# Patient Record
Sex: FEMALE | Race: WHITE | NOT HISPANIC OR LATINO | Employment: PART TIME | URBAN - METROPOLITAN AREA
[De-identification: names, ages, dates, MRNs, and addresses within clinical notes are randomized per-mention and may not be internally consistent; named-entity substitution may affect disease eponyms.]

---

## 2019-02-26 ENCOUNTER — TRANSFERRED RECORDS (OUTPATIENT)
Dept: HEALTH INFORMATION MANAGEMENT | Facility: CLINIC | Age: 59
End: 2019-02-26

## 2019-02-26 ENCOUNTER — MEDICAL CORRESPONDENCE (OUTPATIENT)
Dept: HEALTH INFORMATION MANAGEMENT | Facility: CLINIC | Age: 59
End: 2019-02-26

## 2019-02-26 LAB
CHOLEST SERPL-MCNC: 215 MG/DL (ref 100–199)
GLUCOSE SERPL-MCNC: 87 MG/DL (ref 65–100)
HDLC SERPL-MCNC: 91 MG/DL
HEP C HIM: NORMAL
LDLC SERPL CALC-MCNC: 112 MG/DL
NONHDLC SERPL-MCNC: 124 MG/DL
TRIGL SERPL-MCNC: 60 MG/DL
TSH SERPL-ACNC: 2.44 UIU/ML (ref 0.35–4.94)

## 2019-07-11 ENCOUNTER — PRE VISIT (OUTPATIENT)
Dept: DERMATOLOGY | Facility: CLINIC | Age: 59
End: 2019-07-11

## 2019-07-22 ENCOUNTER — TELEPHONE (OUTPATIENT)
Dept: DERMATOLOGY | Facility: CLINIC | Age: 59
End: 2019-07-22

## 2019-07-22 NOTE — TELEPHONE ENCOUNTER
Left message for patient regarding upcoming appointment on 8/6/19 at 1015am.  Advised PT to call [phone] for previsit questions.  Shannan

## 2019-07-30 NOTE — TELEPHONE ENCOUNTER
**LOOK AT HEALTH MAINTENANCE**        Dermatology Pre-visit Call:    Reason for visit: Hair Loss     Any personal history of skin cancers: Yes    Any family history of skin cancers: Yes    Was the patient referred: Yes    If the patient was referred, are records obtained: Care Everywhere    Has the patient seen a dermatologist in the past: Yes. Records obtained: No - pt will only be seen at [facility] for hair loss - She will continue care for her history of NMSC with her regular dermatologist.    Patient Reminders Given:  --Please, make sure you bring an updated list of your medications, allergies and insurance information.  --Plan on being in our facility for approximately one hour, this includes the registration process, office visit, education and check-out process.  If you are having a procedure, more time may be required.     --We are located on the second floor of the building, check in desk D.   --If you need to cancel or reschedule, call [phone].  --We look forward to seeing you in Dermatology Clinic.       Sonali Hernandez, CMA

## 2019-08-06 ENCOUNTER — OFFICE VISIT (OUTPATIENT)
Dept: DERMATOLOGY | Facility: CLINIC | Age: 59
End: 2019-08-06
Payer: COMMERCIAL

## 2019-08-06 DIAGNOSIS — L64.9 ANDROGENETIC ALOPECIA: Primary | ICD-10-CM

## 2019-08-06 LAB
BASOPHILS # BLD AUTO: 0 10E9/L (ref 0–0.2)
BASOPHILS NFR BLD AUTO: 0.6 %
DIFFERENTIAL METHOD BLD: NORMAL
EOSINOPHIL # BLD AUTO: 0.1 10E9/L (ref 0–0.7)
EOSINOPHIL NFR BLD AUTO: 1 %
ERYTHROCYTE [DISTWIDTH] IN BLOOD BY AUTOMATED COUNT: 13.3 % (ref 10–15)
FERRITIN SERPL-MCNC: 65 NG/ML (ref 8–252)
HCT VFR BLD AUTO: 42.1 % (ref 35–47)
HGB BLD-MCNC: 13.7 G/DL (ref 11.7–15.7)
IMM GRANULOCYTES # BLD: 0 10E9/L (ref 0–0.4)
IMM GRANULOCYTES NFR BLD: 0.5 %
LYMPHOCYTES # BLD AUTO: 2.1 10E9/L (ref 0.8–5.3)
LYMPHOCYTES NFR BLD AUTO: 32.7 %
MCH RBC QN AUTO: 29.1 PG (ref 26.5–33)
MCHC RBC AUTO-ENTMCNC: 32.5 G/DL (ref 31.5–36.5)
MCV RBC AUTO: 89 FL (ref 78–100)
MONOCYTES # BLD AUTO: 0.5 10E9/L (ref 0–1.3)
MONOCYTES NFR BLD AUTO: 7.8 %
NEUTROPHILS # BLD AUTO: 3.6 10E9/L (ref 1.6–8.3)
NEUTROPHILS NFR BLD AUTO: 57.4 %
PLATELET # BLD AUTO: 254 10E9/L (ref 150–450)
RBC # BLD AUTO: 4.71 10E12/L (ref 3.8–5.2)
TSH SERPL DL<=0.005 MIU/L-ACNC: 1.6 MU/L (ref 0.4–4)
WBC # BLD AUTO: 6.3 10E9/L (ref 4–11)

## 2019-08-06 PROCEDURE — 84443 ASSAY THYROID STIM HORMONE: CPT | Performed by: DERMATOLOGY

## 2019-08-06 PROCEDURE — 82728 ASSAY OF FERRITIN: CPT | Performed by: DERMATOLOGY

## 2019-08-06 PROCEDURE — 36415 COLL VENOUS BLD VENIPUNCTURE: CPT | Performed by: DERMATOLOGY

## 2019-08-06 PROCEDURE — 85025 COMPLETE CBC W/AUTO DIFF WBC: CPT | Performed by: DERMATOLOGY

## 2019-08-06 PROCEDURE — 99202 OFFICE O/P NEW SF 15 MIN: CPT | Performed by: DERMATOLOGY

## 2019-08-06 PROCEDURE — 82306 VITAMIN D 25 HYDROXY: CPT | Performed by: DERMATOLOGY

## 2019-08-06 ASSESSMENT — PAIN SCALES - GENERAL: PAINLEVEL: NO PAIN (0)

## 2019-08-06 NOTE — PROGRESS NOTES
[facility] Dermatology Note      Dermatology Problem List:  1. BCC  - previously treated at [facility]. Plans to continue skin checks at [facility] in the future.   2. Androgenetic Alopecia  - current t/x: Rogaine 5% solution   - labs ordered 8/6/19    Encounter Date: Aug 6, 2019    CC:  Chief Complaint   Patient presents with     Hair Loss     5 years, tried a shampoo and conditioner cant remember the name, tried rogaine without results, noticing most on crown above forehead.       History of Present Illness:  Ms. Griselda Lobo is a 59 year old female who presents in self referral for hair loss. She has not been previously seen by a dermatologist for her hair loss. She has been losing hair for 10 years, mostly around the crown and above her forehead. Was gradually losing hair but is has seemed it has seemed to speed up in 2 years. She has tried a shampoo and conditioner but cannot remember which one. She has also tried using Rogaine without significant results. She tried the 2% foam and did not like how it made her hair greasy. She used this about a year and a half ago. She only used it for less than a month. She is afraid to wash her hair as she notices it falling out and breaking hair. She washes her hair twice a week. She states hair is both thinning and shedding. She denies loss of eyebrows and eye lashes but sparse pubic hair. She wears hair in pony tail but not tightly. She dries with hair dryer and dyes her hair regularly. She notes little scalp itch, no sores. She is postmenopausal for last 16 years. She has a personal and family history of bcc in her dad. No other concerns addressed today.      Past Medical History:   Patient Active Problem List   Diagnosis     History of basal cell carcinoma     Impingement syndrome of right shoulder     Right shoulder pain     Scapular dyskinesis     History reviewed. No pertinent past medical history.  History reviewed. No pertinent  surgical history.    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     Family History:  Family history of BCC in father. Mom with hair loss (mom is in her 90's but has very little hair remaining on the central scalp)    Medications:  No current outpatient medications on file.       No Known Allergies    Review of Systems:  -Constitutional: Patient is otherwise feeling well, in usual state of health.   -Skin: As above in HPI. No additional skin concerns.    Physical exam:  Vitals: There were no vitals taken for this visit.  GEN: This is a well developed, well-nourished female in no acute distress, in a pleasant mood.   SKIN: Sun-exposed skin, which includes the head/face, neck, both arms, digits, and/or nails was examined.   - Waldron Type I-II  - Frontal hairline not regressed  - Terminal sparse 2 cm along frontal hair line  - Temporal hair line is not regressed, normal vellus hairs throughout this area  - Normal eyebrows and eye lashes  - Part width is slightly widened at 1.1, more pronounced at the anterior aspect rather than at the posterior aspect, has franko tree appearance.   - No other lesions of concern on areas examined.                       Impression/Plan:    1. Androgenetic Alopecia. Patient notes she is emotionally affected by this hair loss. 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 Discussed that the pathogenesis is not well understood and that without treatment this is a progressive condition. The most effective and researched treatment is Rogaine. Patient is willing to retry. If she is unable to tolerate, photobiomodulation is an option. Gave handout on this today. Discussed that finasteride in women is typically not very helpful and has significant side effects. Discussed that hair transplant in women has more unpredictable results. She could  see Dr. Sebastián Browning if interested in this for consultation.     Advised patient to begin Rogaine 5% solution. Discussed increased shedding in first month of use. Discussed that patient should commit to treatment for 6 months before noticing results. Discussed potential side effects of Rogaine, including scalp irritation and facial hair growth.    Advised against the use of Biotin,as there is no evidence that it improves hair loss and could result in abnormal lab findings.    Labs ordered: ferritin, vitamin D, platelet count, TSH       Follow-up in 6 months for hair loss, earlier for new or changing lesions.       Staff Involved:  Scribe/Staff    Scribe Disclosure  I, Josefina Atkins, am serving as a scribe to document services personally performed by Dr. Paige Riddle MD, based on data collection and the provider's statements to me.     Provider Disclosure:   The documentation recorded by the scribe accurately reflects the services I personally performed and the decisions made by me.    Paige Riddle MD    Department of Dermatology  [facility]: Phone: [phone], Fax:[phone]  [facility]: Phone: [phone], Fax: [phone]

## 2019-08-06 NOTE — NURSING NOTE
Griselda Lobo's goals for this visit include:   Chief Complaint   Patient presents with     Hair Loss     5 years, tried a shampoo and conditioner cant remember the name, tried rogaine without results, noticing most on crown above forehead.       She requests these members of her care team be copied on today's visit information: Yes    PCP: Mirna Valdes    Referring Provider:  No referring provider defined for this encounter.    There were no vitals taken for this visit.    Do you need any medication refills at today's visit? Francoise Ballesteros LPN

## 2019-08-06 NOTE — LETTER
8/6/2019         RE: Griselda Lobo  [address] [place] [address]        Dear Colleague,    Thank you for referring your patient, Griselda Lobo, to the [facility]. Please see a copy of my visit note below.    [facility] Dermatology Note      Dermatology Problem List:  1. BCC  - previously treated at [facility]. Plans to continue skin checks at [facility] in the future.   2. Androgenetic Alopecia  - current t/x: Rogaine 5% solution   - labs ordered 8/6/19    Encounter Date: Aug 6, 2019    CC:  Chief Complaint   Patient presents with     Hair Loss     5 years, tried a shampoo and conditioner cant remember the name, tried rogaine without results, noticing most on crown above forehead.       History of Present Illness:  Ms. Griselda Lobo is a 59 year old female who presents in self referral for hair loss. She has not been previously seen by a dermatologist for her hair loss. She has been losing hair for 10 years, mostly around the crown and above her forehead. Was gradually losing hair but is has seemed it has seemed to speed up in 2 years. She has tried a shampoo and conditioner but cannot remember which one. She has also tried using Rogaine without significant results. She tried the 2% foam and did not like how it made her hair greasy. She used this about a year and a half ago. She only used it for less than a month. She is afraid to wash her hair as she notices it falling out and breaking hair. She washes her hair twice a week. She states hair is both thinning and shedding. She denies loss of eyebrows and eye lashes but sparse pubic hair. She wears hair in pony tail but not tightly. She dries with hair dryer and dyes her hair regularly. She notes little scalp itch, no sores. She is postmenopausal for last 16 years. She has a personal and family history of bcc in her dad. No other concerns addressed today.      Past Medical History:   Patient Active  Problem List   Diagnosis     History of basal cell carcinoma     Impingement syndrome of right shoulder     Right shoulder pain     Scapular dyskinesis     History reviewed. No pertinent past medical history.  History reviewed. No pertinent surgical history.    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     Family History:  Family history of BCC in father. Mom with hair loss (mom is in her 90's but has very little hair remaining on the central scalp)    Medications:  No current outpatient medications on file.       No Known Allergies    Review of Systems:  -Constitutional: Patient is otherwise feeling well, in usual state of health.   -Skin: As above in HPI. No additional skin concerns.    Physical exam:  Vitals: There were no vitals taken for this visit.  GEN: This is a well developed, well-nourished female in no acute distress, in a pleasant mood.   SKIN: Sun-exposed skin, which includes the head/face, neck, both arms, digits, and/or nails was examined.   - Waldron Type I-II  - Frontal hairline not regressed  - Terminal sparse 2 cm along frontal hair line  - Temporal hair line is not regressed, normal vellus hairs throughout this area  - Normal eyebrows and eye lashes  - Part width is slightly widened at 1.1, more pronounced at the anterior aspect rather than at the posterior aspect, has franko tree appearance.   - No other lesions of concern on areas examined.                       Impression/Plan:    1. Androgenetic Alopecia. Patient notes she is emotionally affected by this hair loss. 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 Discussed that the pathogenesis is not well understood and that without treatment this is a progressive condition. The most effective and researched treatment is Rogaine. Patient is willing to retry. If she is unable to tolerate,  photobiomodulation is an option. Gave handout on this today. Discussed that finasteride in women is typically not very helpful and has significant side effects. Discussed that hair transplant in women has more unpredictable results. She could see Dr. Sebastián Browning if interested in this for consultation.     Advised patient to begin Rogaine 5% solution. Discussed increased shedding in first month of use. Discussed that patient should commit to treatment for 6 months before noticing results. Discussed potential side effects of Rogaine, including scalp irritation and facial hair growth.    Advised against the use of Biotin,as there is no evidence that it improves hair loss and could result in abnormal lab findings.    Labs ordered: ferritin, vitamin D, platelet count, TSH       Follow-up in 6 months for hair loss, earlier for new or changing lesions.       Staff Involved:  Scribe/Staff    Scribe Disclosure  I, Josefina Atkins, am serving as a scribe to document services personally performed by Dr. Paige Riddle MD, based on data collection and the provider's statements to me.     Provider Disclosure:   The documentation recorded by the scribe accurately reflects the services I personally performed and the decisions made by me.    Paige Riddle MD    Department of Dermatology  [facility]: Phone: [phone], Fax:[phone]  [facility]: Phone: [phone], Fax: [phone]                Again, thank you for allowing me to participate in the care of your patient.        Sincerely,        Paige Riddle MD

## 2019-08-06 NOTE — PATIENT INSTRUCTIONS
Begin Rogaine 5% solution. Apply to the scalp and leave on for one hour each night. You will notice increased shedding in first month of use but you should commit to treatment for 6 months before noticing results. You may notice scalp irritation and facial hair growth.

## 2019-08-07 ENCOUNTER — TELEPHONE (OUTPATIENT)
Dept: DERMATOLOGY | Facility: CLINIC | Age: 59
End: 2019-08-07

## 2019-08-07 LAB — DEPRECATED CALCIDIOL+CALCIFEROL SERPL-MC: 22 UG/L (ref 20–75)

## 2019-08-07 NOTE — TELEPHONE ENCOUNTER
Result Notes for Vitamin D Deficiency     Notes recorded by Audra Schmitz LPN on 8/7/2019 at 4:38 PM CDT  Spoke to pt regarding results. Pt denies questions or concerns at this time.  Audra Schmitz LPN    ------    Notes recorded by Paige Riddle MD on 8/7/2019 at 3:32 PM CDT  Please let patient know all labs are wnl. No need for iron or vitamin D supplementation.    Thanks,    Paige Riddle MD    Department of Dermatology  [facility]: Phone: [phone], Fax:[phone]  [facility]: Phone: [phone], Fax: [phone]

## 2020-02-11 ENCOUNTER — OFFICE VISIT (OUTPATIENT)
Dept: DERMATOLOGY | Facility: CLINIC | Age: 60
End: 2020-02-11
Payer: COMMERCIAL

## 2020-02-11 DIAGNOSIS — L64.9 ANDROGENETIC ALOPECIA: Primary | ICD-10-CM

## 2020-02-11 PROCEDURE — 99213 OFFICE O/P EST LOW 20 MIN: CPT | Performed by: DERMATOLOGY

## 2020-02-11 ASSESSMENT — PAIN SCALES - GENERAL: PAINLEVEL: NO PAIN (0)

## 2020-02-11 NOTE — PROGRESS NOTES
"[facility] Dermatology Note    Dermatology Problem List:  1. BCC  - previously treated at [facility]. Plans to continue skin checks at [facility] in the future.   2. Androgenetic Alopecia  - current t/x: Rogaine 5% solution   - hair labs ordered 8/6/19, [facility]    Encounter Date: Feb 11, 2020    CC:  Chief Complaint   Patient presents with     Hair Loss     using rogaine nightly(1oz); seeing slight imprvement - noticing less fall out       History of Present Illness:  Ms. Griselda Lobo is a 60 year old female who presents as a follow-up for hair loss. The patient was last seen on 8/6/2019 when rogaine 5% solution was started and ferritin, vitamin D, platelet count, TSH labs were checked for androgenetic alopecia. Today the patient notes she has noticed less hair falling out since starting treatment with rogaine. She inquires about the amount of rogaine she should be using on her scalp. She uses 1 ounce currently. She wonders if she should be using 2 ounces. The patient states she went through menopause \"a long time ago\". No other concerns addressed today.    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  Reviewed and left in chart for clinician convenience.       Family History:  Family history of BCC in father. Mom with hair loss (mom is in her 90's but has very little hair remaining on the central scalp)  Reviewed and left in chart for clinician convenience.       Medications:  Current Outpatient Medications   Medication Sig Dispense Refill     minoxidil (MINOXIDIL FOR MEN) 5 % external solution          No Known Allergies    Review of Systems:  -Constitutional: Patient is otherwise feeling well, in usual state " of health.   -Skin: As above in HPI. No additional skin concerns.    Physical exam:  Vitals: There were no vitals taken for this visit.  GEN: This is a well developed, well-nourished female in no acute distress, in a pleasant mood.   SKIN: Sun-exposed skin, which includes the head/face, neck, both arms, digits, and/or nails was examined.   -No regression of the frontal hairline  -Terminal hair fibers that range in size between 4-5 cm along the frontal hair line  -Normal vellus hairs at the temples bilaterally  -Temporal hairline has not regressed   - Part width is within normal limits at a 1  - Overall on examination a slight decrease in density in the vertex and central scalp scalp, occipital and parietal scalp are within normal limits  - No other lesions of concern on areas examined.     Impression/Plan:    1. Androgenetic Alopecia. 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 She may have had  Minor telogen effluvium that unmasked AGA. Discussed that the pathogenesis is not well understood and that without treatment this is a progressive condition. Condition is stable at this time. Discussed that oral finasteride or platelet rich plasma injections are possible treatment option in the future.    Advised patient to continue Rogaine 5% solution. Discussed potential side effects of Rogaine, including scalp irritation and facial hair growth.    Advised against the use of Biotin, as there is no evidence that it improves hair loss and could result in abnormal lab findings.    Discussed finasteride or platelet rich plasma injections as possible treatment options in the future if patient is unhappy with results from rogaine    CC Paige Riddle MD  [address] [place] [phone] on close of this encounter.  Follow-up prn, earlier for new or changing lesions.     Staff Involved:  Scribe/Staff    Scribe Disclosure  I, Chris Vernon, am serving as a  scribe to document services personally performed by Dr. Paige Riddle MD, based on data collection and the provider's statements to me.     Provider Disclosure:   The documentation recorded by the scribe accurately reflects the services I personally performed and the decisions made by me.    Paige Riddle MD    Department of Dermatology  [facility]: Phone: [phone], Fax:[phone]  [facility]: Phone: [phone], Fax: [phone]

## 2020-02-11 NOTE — LETTER
"    2/11/2020         RE: Griselda Lobo  [address] [place] [address]        Dear Colleague,    Thank you for referring your patient, Griselda Lobo, to the [facility]. Please see a copy of my visit note below.    [facility] Dermatology Note    Dermatology Problem List:  1. BCC  - previously treated at [facility]. Plans to continue skin checks at [facility] in the future.   2. Androgenetic Alopecia  - current t/x: Rogaine 5% solution   - hair labs ordered 8/6/19, wnl    Encounter Date: Feb 11, 2020    CC:  Chief Complaint   Patient presents with     Hair Loss     using rogaine nightly(1oz); seeing slight imprvement - noticing less fall out       History of Present Illness:  Ms. Griselda Lobo is a 60 year old female who presents as a follow-up for hair loss. The patient was last seen on 8/6/2019 when rogaine 5% solution was started and ferritin, vitamin D, platelet count, TSH labs were checked for androgenetic alopecia. Today the patient notes she has noticed less hair falling out since starting treatment with rogaine. She inquires about the amount of rogaine she should be using on her scalp. She uses 1 ounce currently. She wonders if she should be using 2 ounces. The patient states she went through menopause \"a long time ago\". No other concerns addressed today.    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  Reviewed and left in chart for clinician convenience.       Family History:  Family history of BCC in father. Mom with hair loss (mom is in her 90's but has very little hair remaining on the central scalp)  Reviewed and left in chart for clinician convenience. "       Medications:  Current Outpatient Medications   Medication Sig Dispense Refill     minoxidil (MINOXIDIL FOR MEN) 5 % external solution          No Known Allergies    Review of Systems:  -Constitutional: Patient is otherwise feeling well, in usual state of health.   -Skin: As above in HPI. No additional skin concerns.    Physical exam:  Vitals: There were no vitals taken for this visit.  GEN: This is a well developed, well-nourished female in no acute distress, in a pleasant mood.   SKIN: Sun-exposed skin, which includes the head/face, neck, both arms, digits, and/or nails was examined.   -No regression of the frontal hairline  -Terminal hair fibers that range in size between 4-5 cm along the frontal hair line  -Normal vellus hairs at the temples bilaterally  -Temporal hairline has not regressed   - Part width is within normal limits at a 1  - Overall on examination a slight decrease in density in the vertex and central scalp scalp, occipital and parietal scalp are within normal limits  - No other lesions of concern on areas examined.     Impression/Plan:    1. Androgenetic Alopecia. 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 She may have had  Minor telogen effluvium that unmasked AGA. Discussed that the pathogenesis is not well understood and that without treatment this is a progressive condition.  Condition is stable at this time. Discussed that oral finasteride or platelet rich plasma injections are possible treatment option in the future.    Advised patient to continue Rogaine 5% solution. Discussed potential side effects of Rogaine, including scalp irritation and facial hair growth.    Advised against the use of Biotin, as there is no evidence that it improves hair loss and could result in abnormal lab findings.    Discussed finasteride or platelet rich plasma injections as possible treatment options in the future if patient is unhappy  with results from flavia Riddle MD  [address] [place] [address] on close of this encounter.  Follow-up prn, earlier for new or changing lesions.     Staff Involved:  Scribe/Staff    Scribe Disclosure  I, Chris Vernon, am serving as a scribe to document services personally performed by Dr. Paige Riddle MD, based on data collection and the provider's statements to me.     Provider Disclosure:   The documentation recorded by the scribe accurately reflects the services I personally performed and the decisions made by me.    Paige Riddle MD    Department of Dermatology  [facility]: Phone: [phone], Fax:[phone]  [facility]: Phone: [phone], Fax: [phone]              Again, thank you for allowing me to participate in the care of your patient.        Sincerely,        Paige Riddle MD

## 2020-02-11 NOTE — NURSING NOTE
@Griselda Lobo's goals for this visit include:   Chief Complaint   Patient presents with     Hair Loss     using rogaine nightly(1oz); seeing slight imprvement - noticing less fall out       She requests these members of her care team be copied on today's visit information: NO    PCP: Mirna Valdes    Referring Provider:  Paige Riddle MD  [address] [place] [id number]    There were no vitals taken for this visit.    Do you need any medication refills at today's visit? NO    Sonali Hernandez [facility]

## 2021-07-02 ENCOUNTER — OFFICE VISIT (OUTPATIENT)
Dept: PLASTIC SURGERY | Facility: CLINIC | Age: 61
End: 2021-07-02

## 2021-07-02 DIAGNOSIS — Z41.1 ENCOUNTER FOR COSMETIC PROCEDURE: Primary | ICD-10-CM

## 2021-07-02 NOTE — LETTER
7/2/2021       RE: Griselda Lobo  [address] [place] [address]     Dear Colleague,    Thank you for referring your patient, Griselda Lobo, to the [facility] at [facility]. Please see a copy of my visit note below.    Facial Plastic and Reconstructive Surgery Consultation    Referring Provider:   Referred Self, MD  No address on file    HPI:   I had the pleasure of seeing Griselda Lobo today in clinic for consultation for facial rejuvenation. 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    Griselda Lobo is a 61 year old female who notes excess skin laxity and concern about facial aging.     PMH: Glaucoma, takes lantanaprost  PSH: Tonsillectomy in 1972 and D+C in 1990    Non smoker   NKDA        Review Of Systems  ROS: 10 point ROS neg other than the symptoms noted above in the HPI.    Patient Active Problem List   Diagnosis     History of basal cell carcinoma     Impingement syndrome of right shoulder     Right shoulder pain     Scapular dyskinesis     No past surgical history on file.  Current Outpatient Medications   Medication Sig Dispense Refill     minoxidil (MINOXIDIL FOR MEN) 5 % external solution        Patient has no known allergies.  Social History     Socioeconomic History     Marital status:      Spouse name: Not on file     Number of children: Not on file     Years of education: Not on file     Highest education level: Not on file   Occupational History     Not on file   Social Needs     Financial resource strain: Not on file     Food insecurity     Worry: Not on file     Inability: Not on file     Transportation needs     Medical: Not on file     Non-medical: Not on file   Tobacco Use     Smoking status: Never Smoker     Smokeless tobacco: Never Used   Substance and Sexual Activity     Alcohol use: Not on file     Drug use: Not on file      Sexual activity: Not on file   Lifestyle     Physical activity     Days per week: Not on file     Minutes per session: Not on file     Stress: Not on file   Relationships     Social connections     Talks on phone: Not on file     Gets together: Not on file     Attends [employer]ism service: Not on file     Active member of club or organization: Not on file     Attends meetings of clubs or organizations: Not on file     Relationship status: Not on file     Intimate partner violence     Fear of current or ex partner: Not on file     Emotionally abused: Not on file     Physically abused: Not on file     Forced sexual activity: Not on file   Other Topics Concern     Not on file   Social History Narrative     Not on file     No family history on file.    PE:  Alert and Oriented, Answering Questions Appropriately  Atraumatic, Normocephalic, Face Symmetric  Skin: Waldron 2, superficial rhytids and crepiness  Facial Nerve Intact and facial movement symmetric  EOM's, PEERL. Dermatochalasis bilaterally, lower eyelid skin excess and laxity  Jowls and prominent neck lines  Nasal Exam: No external Deformity, no mucopurulence or polyps, no masses  Chin: Normal   Lips/Teeth/Toungue/Gums: Lips intact, Normal Dentition, Occlusion intact, Oral mucosa intact, no lesions/ulcerations/masses, Tongue mobile         IMPRESSION:    Encounter for consideration of procedures for facial aging,      PLAN:    Recommended upper eyelid blepharoplasty and face lift    Both procedures discussed with risks and benefits outlined. Quote given.     Photodocumentation was obtained.                   Again, thank you for allowing me to participate in the care of your patient.      Sincerely,    Radha Castillo MD

## 2021-07-02 NOTE — PROGRESS NOTES
Facial Plastic and Reconstructive Surgery Consultation    Referring Provider:   Referred Self, MD  No address on file    HPI:   I had the pleasure of seeing Griselda Lobo today in clinic for consultation for facial rejuvenation. 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    Griselda Lobo is a 61 year old female who notes excess skin laxity and concern about facial aging.     PMH: Glaucoma, takes lantanaprost  PSH: Tonsillectomy in 1972 and D+C in 1990    Non smoker   NKDA        Review Of Systems  ROS: 10 point ROS neg other than the symptoms noted above in the HPI.    Patient Active Problem List   Diagnosis     History of basal cell carcinoma     Impingement syndrome of right shoulder     Right shoulder pain     Scapular dyskinesis     No past surgical history on file.  Current Outpatient Medications   Medication Sig Dispense Refill     minoxidil (MINOXIDIL FOR MEN) 5 % external solution        Patient has no known allergies.  Social History     Socioeconomic History     Marital status:      Spouse name: Not on file     Number of children: Not on file     Years of education: Not on file     Highest education level: Not on file   Occupational History     Not on file   Social Needs     Financial resource strain: Not on file     Food insecurity     Worry: Not on file     Inability: Not on file     Transportation needs     Medical: Not on file     Non-medical: Not on file   Tobacco Use     Smoking status: Never Smoker     Smokeless tobacco: Never Used   Substance and Sexual Activity     Alcohol use: Not on file     Drug use: Not on file     Sexual activity: Not on file   Lifestyle     Physical activity     Days per week: Not on file     Minutes per session: Not on file     Stress: Not on file   Relationships     Social connections     Talks on phone: Not on file     Gets together: Not on file     Attends Rastafarian  service: Not on file     Active member of club or organization: Not on file     Attends meetings of clubs or organizations: Not on file     Relationship status: Not on file     Intimate partner violence     Fear of current or ex partner: Not on file     Emotionally abused: Not on file     Physically abused: Not on file     Forced sexual activity: Not on file   Other Topics Concern     Not on file   Social History Narrative     Not on file     No family history on file.    PE:  Alert and Oriented, Answering Questions Appropriately  Atraumatic, Normocephalic, Face Symmetric  Skin: Waldron 2, superficial rhytids and crepiness  Facial Nerve Intact and facial movement symmetric  EOM's, PEERL. Dermatochalasis bilaterally, lower eyelid skin excess and laxity  Jowls and prominent neck lines  Nasal Exam: No external Deformity, no mucopurulence or polyps, no masses  Chin: Normal   Lips/Teeth/Toungue/Gums: Lips intact, Normal Dentition, Occlusion intact, Oral mucosa intact, no lesions/ulcerations/masses, Tongue mobile         IMPRESSION:    Encounter for consideration of procedures for facial aging,      PLAN:    Recommended upper eyelid blepharoplasty and face lift    Both procedures discussed with risks and benefits outlined. Quote given.     Photodocumentation was obtained.

## 2021-07-02 NOTE — LETTER
August 16, 2021  Re: Griselda Lobo  1960    Dear Dr. Colmenares,    Thank you so much for referring Griselda Lobo to the [facility]. I had the pleasure of visiting with Griselda today.     Attached you will find a copy of my note. Please feel free to reach out to me with any questions, [phone].     Facial Plastic and Reconstructive Surgery Consultation    Referring Provider:   Referred Self, MD  No address on file    HPI:   I had the pleasure of seeing Griselda Lobo today in clinic for consultation for facial rejuvenation. 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    Griselda Lobo is a 61 year old female who notes excess skin laxity and concern about facial aging.     PMH: Glaucoma, takes lantanaprost  PSH: Tonsillectomy in 1972 and D+C in 1990    Non smoker   NKDA        Review Of Systems  ROS: 10 point ROS neg other than the symptoms noted above in the HPI.    Patient Active Problem List   Diagnosis     History of basal cell carcinoma     Impingement syndrome of right shoulder     Right shoulder pain     Scapular dyskinesis     No past surgical history on file.  Current Outpatient Medications   Medication Sig Dispense Refill     minoxidil (MINOXIDIL FOR MEN) 5 % external solution        Patient has no known allergies.  Social History     Socioeconomic History     Marital status:      Spouse name: Not on file     Number of children: Not on file     Years of education: Not on file     Highest education level: Not on file   Occupational History     Not on file   Social Needs     Financial resource strain: Not on file     Food insecurity     Worry: Not on file     Inability: Not on file     Transportation needs     Medical: Not on file     Non-medical: Not on file   Tobacco Use     Smoking status: Never Smoker     Smokeless tobacco: Never Used   Substance and Sexual Activity     Alcohol use: Not on  file     Drug use: Not on file     Sexual activity: Not on file   Lifestyle     Physical activity     Days per week: Not on file     Minutes per session: Not on file     Stress: Not on file   Relationships     Social connections     Talks on phone: Not on file     Gets together: Not on file     Attends Ep[employer] service: Not on file     Active member of club or organization: Not on file     Attends meetings of clubs or organizations: Not on file     Relationship status: Not on file     Intimate partner violence     Fear of current or ex partner: Not on file     Emotionally abused: Not on file     Physically abused: Not on file     Forced sexual activity: Not on file   Other Topics Concern     Not on file   Social History Narrative     Not on file     No family history on file.    PE:  Alert and Oriented, Answering Questions Appropriately  Atraumatic, Normocephalic, Face Symmetric  Skin: Waldron 2, superficial rhytids and crepiness  Facial Nerve Intact and facial movement symmetric  EOM's, PEERL. Dermatochalasis bilaterally, lower eyelid skin excess and laxity  Jowls and prominent neck lines  Nasal Exam: No external Deformity, no mucopurulence or polyps, no masses  Chin: Normal   Lips/Teeth/Toungue/Gums: Lips intact, Normal Dentition, Occlusion intact, Oral mucosa intact, no lesions/ulcerations/masses, Tongue mobile         IMPRESSION:    Encounter for consideration of procedures for facial aging,      PLAN:    Recommended upper eyelid blepharoplasty and face lift    Both procedures discussed with risks and benefits outlined. Quote given.     Photodocumentation was obtained.                     Your trust in our practice and care is much appreciated.    Sincerely,  Radha Castillo MD

## 2021-07-08 NOTE — NURSING NOTE
2D/3D photodocumentation obtained.    Gave pt a cosmetic quote for Bilateral Upper Eyelid Blepharoplasty and Facelift (see Media tab).    Discussed quote in great detail with pt, including the fact that the Anesthesia and Facility fees are an estimate based on time and may be subject to change.    Pt will call when/if ready to schedule.    Danyell Price RN  7/8/2021 3:06 PM

## 2022-12-13 ENCOUNTER — OFFICE VISIT (OUTPATIENT)
Dept: VASCULAR SURGERY | Facility: CLINIC | Age: 62
End: 2022-12-13
Payer: COMMERCIAL

## 2022-12-13 DIAGNOSIS — I83.93 SPIDER VEINS OF BOTH LOWER EXTREMITIES: Primary | ICD-10-CM

## 2022-12-13 PROCEDURE — 99202 OFFICE O/P NEW SF 15 MIN: CPT | Performed by: SURGERY

## 2022-12-13 RX ORDER — LATANOPROST 50 UG/ML
SOLUTION/ DROPS OPHTHALMIC
COMMUNITY
Start: 2022-10-26

## 2022-12-13 NOTE — PROGRESS NOTES
[facility] CONSULTATION    HPI:    Griselda Lobo is a pleasant 62 year old, self-referred female who presents with complaints of bilateral lower extremity pain and varicose veins.  Varicose veins have been present for years but she has began noticing them more recently.  She describes aching pain in her right calf, worse after she has sat or stood for long periods of time, improving with elevation of the legs.  She has worn compression hose for years, especially when she flies.  She has no significant swelling, has not suffered superficial thrombophlebitis, deep vein thrombosis or hemorrhage.    Family history is negative for varicose veins.    PAST MEDICAL HISTORY: No past medical history on file.    PAST SURGICAL HISTORY: No past surgical history on file.    FAMILY HISTORY: No family history on file.    SOCIAL HISTORY:   Social History     Tobacco Use     Smoking status: Never     Smokeless tobacco: Never   Substance Use Topics     Alcohol use: Not on file       REVIEW OF SYSTEMS: Review Of Systems  Skin: Rash  Eyes: Vision loss  Ears/Nose/Throat: negative  Respiratory: No shortness of breath, dyspnea on exertion, cough, or hemoptysis  Cardiovascular: Negative  Gastrointestinal: Nausea  Genitourinary: negative  Musculoskeletal: negative  Neurologic: negative  Psychiatric: negative  Hematologic/Lymphatic/Immunologic: negative  Endocrine: negative      Vital signs:  There were no vitals taken for this visit.    Current Outpatient Medications   Medication Sig Dispense Refill     latanoprost (XALATAN) 0.005 % ophthalmic solution INSTILL 1 DROP IN BOTH EYES EVERY DAY AT BEDTIME         PHYSICAL EXAM:  General: Pleasant, NAD.   HEENT: Normocephalic, atraumatic, external ears and nose normal.   Respiratory: Normal respiratory effort.   Cardiovascular: Pulse is regular.   Musculoskeletal: Gait and station normal.  The joints of her fingers and toes without deformity.  There is no cyanosis of her nailbeds.    EXTREMITIES: Right lower extremity: 3 mm varicosity in the right infrapatellar area.  Scattered telangiectasias over thigh and leg.  A few reticular veins over the right posterior calf.  3 mm vein noted over the right medial thigh.  No stasis changes or significant edema.    Left lower extremity: 3 mm vein of the distal anterior left leg.  Small veins noted over the left posterior calf.  No edema or stasis changes  PULSES: R/L (3=normal pulse, 0=no palpable pulse) dorsalis pedis: 3/3; posterior tibial: 3/3.      Neurologic: Grossly normal  Psychiatric: Mood, affect, judgment and insight are normal     ASSESSMENT:  Minimally symptomatic right leg varicose veins with relatively asymptomatic left leg reticular veins/small varicose veins.  She has bilateral lower extremity spider telangiectasias of cosmetic concern also.  When questioned closely, the pain in the right calf does not seem to be significant or limiting her in her activities.    We discussed most of the vein anatomy, the pathophysiology of venous insufficiency and the mild risks from conservative management of her small varicose veins and telangiectasias.  Risks of conservative management including progression of varicose veins, superficial thrombophlebitis or hemorrhage were discussed.  The patient voiced understanding and her questions answered.    We discussed options for treating her asymptomatic veins as part telangiectasias with direct patient sclerotherapy in the office setting.  Risk of sclerotherapy including allergic reaction, deep antibiosis, superficial thrombophlebitis and hemorrhage were discussed.  She voiced understanding and her questions were answered.  She fully understands that the procedure would not be covered by insurance will be her responsibility.    PLAN:  Possible bilateral lower extremity cosmetic sclerotherapy.     Jonathan Reyez MD    Dictated using Dragon voice recognition software which may result in  transcription errors          [facility] LEG DRAWING:

## 2022-12-13 NOTE — LETTER
12/13/2022         RE: Griselda Lobo  [address] [address]        Dear Colleague,    Thank you for referring your patient, Griselda Lobo, to the [facility] [place]. Please see a copy of my visit note below.        Patient Reported symptoms:    Right leg   Heaviness None of the time   Achiness Some of the time   Swelling None of the time   Throbbing None of the time   Itching None of the time   Appearance Moderately noticeable   Impact on work/activities Symptoms but full able to participate    Left Leg   Heaviness None of the time   Achiness None of the time   Swelling None of the time   Throbbing None of the time   Itching None of the time   Appearance Slightly noticeable   Impact on work/activities Symptoms but full able to participate    [facility] CONSULTATION    HPI:    Griselda Lobo is a pleasant 62 year old, self-referred female who presents with complaints of bilateral lower extremity pain and varicose veins.  Varicose veins have been present for years but she has began noticing them more recently.  She describes aching pain in her right calf, worse after she has sat or stood for long periods of time, improving with elevation of the legs.  She has worn compression hose for years, especially when she flies.  She has no significant swelling, has not suffered superficial thrombophlebitis, deep vein thrombosis or hemorrhage.    Family history is negative for varicose veins.    PAST MEDICAL HISTORY: No past medical history on file.    PAST SURGICAL HISTORY: No past surgical history on file.    FAMILY HISTORY: No family history on file.    SOCIAL HISTORY:   Social History     Tobacco Use     Smoking status: Never     Smokeless tobacco: Never   Substance Use Topics     Alcohol use: Not on file       REVIEW OF SYSTEMS: Review Of Systems  Skin: Rash  Eyes: Vision loss  Ears/Nose/Throat: negative  Respiratory: No shortness of breath, dyspnea on exertion, cough, or  hemoptysis  Cardiovascular: Negative  Gastrointestinal: Nausea  Genitourinary: negative  Musculoskeletal: negative  Neurologic: negative  Psychiatric: negative  Hematologic/Lymphatic/Immunologic: negative  Endocrine: negative      Vital signs:  There were no vitals taken for this visit.    Current Outpatient Medications   Medication Sig Dispense Refill     latanoprost (XALATAN) 0.005 % ophthalmic solution INSTILL 1 DROP IN BOTH EYES EVERY DAY AT BEDTIME         PHYSICAL EXAM:  General: Pleasant, NAD.   HEENT: Normocephalic, atraumatic, external ears and nose normal.   Respiratory: Normal respiratory effort.   Cardiovascular: Pulse is regular.   Musculoskeletal: Gait and station normal.  The joints of her fingers and toes without deformity.  There is no cyanosis of her nailbeds.   EXTREMITIES: Right lower extremity: 3 mm varicosity in the right infrapatellar area.  Scattered telangiectasias over thigh and leg.  A few reticular veins over the right posterior calf.  3 mm vein noted over the right medial thigh.  No stasis changes or significant edema.    Left lower extremity: 3 mm vein of the distal anterior left leg.  Small veins noted over the left posterior calf.  No edema or stasis changes  PULSES: R/L (3=normal pulse, 0=no palpable pulse) dorsalis pedis: 3/3; posterior tibial: 3/3.      Neurologic: Grossly normal  Psychiatric: Mood, affect, judgment and insight are normal     ASSESSMENT:  Minimally symptomatic right leg varicose veins with relatively asymptomatic left leg reticular veins/small varicose veins.  She has bilateral lower extremity spider telangiectasias of cosmetic concern also.  When questioned closely, the pain in the right calf does not seem to be significant or limiting her in her activities.    We discussed most of the vein anatomy, the pathophysiology of venous insufficiency and the mild risks from conservative management of her small varicose veins and telangiectasias.  Risks of conservative  management including progression of varicose veins, superficial thrombophlebitis or hemorrhage were discussed.  The patient voiced understanding and her questions answered.    We discussed options for treating her asymptomatic veins as part telangiectasias with direct patient sclerotherapy in the office setting.  Risk of sclerotherapy including allergic reaction, deep antibiosis, superficial thrombophlebitis and hemorrhage were discussed.  She voiced understanding and her questions were answered.  She fully understands that the procedure would not be covered by insurance will be her responsibility.    PLAN:  Possible bilateral lower extremity cosmetic sclerotherapy.     Jonathan Reyez MD    Dictated using Dragon voice recognition software which may result in transcription errors          [facility] LEG DRAWING:                  Again, thank you for allowing me to participate in the care of your patient.        Sincerely,        Jonathan Reyez MD

## 2022-12-13 NOTE — PATIENT INSTRUCTIONS
Sclerotherapy: Pre-Treatment Instructions    Recommended Sessions:  2-3 treatment sessions    Pricing: Full session - $407                 *Payment is due at the time of visit following the treatment    Time Required per Treatment Session - About 45 minutes  Please come in 15 minutes before your scheduled appointment.  30 min.  Sclerotherapy treatments last approximately 30 minutes.  5 min.    A staff member will wipe your legs off with warm water and dry them with a wash cloth.                 Then you can put your compression hose on, get dressed and check out.  10 min.  After your treatment, you will be asked to walk around for 10 minutes before you get in your car.    Medications  Five days before your appointment, discontinue aspirin (Bufferin, Anacin, etc.) and Ibuprofen (Motrin, Advil, Aleve, etc.) to reduce bruising. Resume these medications the day following the treatment.    Leg Preparation  Do not shave your legs or apply any oil, lotion or powder the night before or the day of your treatment.    Clothing  Shorts:  Bring a pair of loose, comfortable shorts to wear during your treatment (or you can choose to wear ours). Shoes: Bring comfortable shoes to accommodate the compression hose after your treatment. Do not wear flip-flops or thong-style sandals unless you have open-toe compression hose.    Photographs  Photos will be taken before each treatment. This helps monitor your progress.    Injections  The physician will inject your veins with the sclerotherapy solution chosen to meet your specific needs.    Compression Hose  Please bring your compression hose if you have them. They may also be reserved for you at our clinic. Compression hose must be worn immediately after each sclerotherapy treatment.  The hose must be compression level 20-30, and they must be worn for 24 hours straight after your treatment.  If you have never worn compression hose before, a staff member will teach you how to put them  on.             You cannot have a treatment without compression hose.               They are critical to the success of your treatment!    You may purchase your compression hose from us. We will measure you and have the hose available when you come for your treatment.    Cancellation and Rescheduling  If you need to cancel or reschedule your sclerotherapy treatment, please give our office at least 24 hour notice.    Sclerotherapy: Basic Information        What is sclerotherapy?  Sclerotherapy is a treatment for  spider  veins.  Spider  veins are small veins just under your skin that can look red, blue or purple. Most  spider  veins are only a cosmetic problem.  Spider  veins are not useful and treating them will not affect your circulation.    How does sclerotherapy work?  1.  Injections: A very small needle is used to inject a solution into the  spider  veins. The solution irritates the cells that line the vein walls. This causes the veins to collapse. The vein walls to stick together and they can no longer carry blood. Different solutions are used based on the size of the veins.  2.  Compression:  The spider veins are kept collapsed by wearing compression stockings. Your body will break down and absorb the treated veins. You wear the compression hose for 24 hours after the treatment and then for 4 more days during your waking hours only.    How does the body heal after sclerotherapy?  The process is similar to how your body heals after a bad bruise. It takes 4-6 weeks or more for the healing to be complete. When the healing is complete, the vein is no longer visible. It may take more than one treatment.    How do I get the best results?  It is important to follow the post-sclerotherapy instructions. The best results require time and patience. The injection sites will continue to heal and fade for months after a treatment. Please discuss your expectations with your doctor to keep them realistic. Your doctor will do  everything possible to meet or exceed your expectations.    How many treatments are needed?  After your initial exam, your doctor will give you an estimate of the number of treatments that may be required. It depends upon the size, type, and quantity of your  spider  veins and on the doctor's assessment, your history and expectations. You may end up needing fewer or more treatments.    How soon can I have another treatment?  Additional treatments are scheduled every 4-6 weeks to allow time for the body to respond to the previous treatment.    Common Side Effects:  Itching  The areas that were injected may itch. This is usually mild and lasts less than a day. Do not use lotions or creams on your legs until the injection sites have healed over.    Pain  It is common to have some tenderness at the injection sites. Injection of the solution can be uncomfortable, but is usually well tolerated by most patients. The tenderness is temporary, lasting 24 hours at most. Tylenol or Ibuprofen can be used, if needed, following the product directions.    Bruising  This may occur at the injections sites. Bruising may be minimized by avoiding Aspirin and Ibuprofen products for five days before each treatment session.    Hyperpigmentation  A light brown discoloration of the skin may develop along the veins in the areas injected. Approximately 20-30% of patients treated note the discoloration (which is lighter and less obvious than the veins that are being treated). The hyperpigmentation usually fades in a couple of weeks, but may take several months to a year to totally resolve. There is a 1% chance of hyperpigmentation continuing after one year.    Trapped blood  A small amount of blood may become trapped and hardened in the veins. This may feel like a knot or cord and it may look dark blue or bruised. This is a common occurrence. You may need to return before your next treatment so this area can be drained to remove the trapped  blood. This will reduce the hyperpigmentation that can occur. The chance of this occurring can be decreased with proper use of compression hose after your treatment.    Matting  Matting is the formation of new, fine  spider  veins in the area injected.  It occurs in approximately 10% of patients injected. The exact reason for this is unknown. If untreated, the matting usually resolves in 3 to 12 months, but very rarely, it can be permanent. If the matting does not fade, it can be re-injected.    Rare Side Effects:  Ulceration at injection sites  Very rarely, a small ulcer will occur at the site where a vein is injected. An ulcer can take 4 to 6 weeks to completely heal. A small scar may result.    Allergic reactions  There is a very rare incidence of an allergic reaction to the solution injected. You will be observed for such reaction and will be treated appropriately should it occur. Please inform us of any allergy history.    Pulmonary embolus/Deep vein thrombosis  This is a blood clot which moves to the lungs/a blood clot in the deep vein system. There is an extraordinarily low incidence of this complication.      SCLEROTHERAPY AFTER CARE  Immediately:  After treatment, walk for 10-15 minutes before getting in your car.  If your trip home is more than 1 hour, stop and walk around for 5-10 minutes. Avoid sitting or standing for extended periods.   First 24 hours: Wear your compression continuously, even while in bed. After the 24 hours, you may shower if you want to. Put your hose back on, unless you are going to bed. You should NOT wear compression to bed after the first 24 hours. You may fly the next day, but wear your compression.   For 5 days: Wear the compression hose for waking hours only. You may continue to wear them longer than 5 days if you prefer.   For days 5-7: Walking is encouraged, as it promotes efficient circulation in your veins. You may do activities that raise your heart rate, but do NOT run,  jog, do high impact aerobics, or weight lifting. After 7 days, no activity restrictions.  Shaving: Wait a few days to shave or apply lotion.   Bathing: Do NOT take hot baths or sit in a hot tub for 7-10 days.    For 1 year: Wear SPF 30 sunscreen on your legs when in the sun. This is very important! It helps prevent darkening of the skin at the injection sites.   Medications: You may resume your usual medications, including aspirin or ibuprofen.    Common Things to Expect       Compression must be worn for the first 24 hours and then during the day for 5-7 days.    If larger veins are treated with ultrasound-guided sclerotherapy, you will have redness, firmness, tenderness, and swelling.  This firmness and tenderness may take 3-6 months to resolve. Ibuprofen and compression hose will aid in this process.    You will have bruising that can last up to 3 weeks. Most fading of the veins will occur between 3 and 6 weeks after treatment.    You may notice brown discoloration (hyperpigmentation) at the treatment site.  This should fade with time, but will take 3 months to 1 year to fully heal.     Some treated veins may look darker because of trapped blood within the vein. This trapped blood can be removed at a minimum of 1 month following treatment. Larger veins are more likely to develop trapped blood.    It is very important for you to use at least SPF 30 sunscreen in order to help prevent the discoloration of your skin.    Migraines rarely occur following sclerotherapy, but are more likely in patients with a history of migraines.  Treat as you would any other migraine.      [employer] last reviewed this educational content on 11/1/2019 2000-2021 [employer]. All rights reserved. This information is not intended as a substitute for professional medical care. Always follow your healthcare professional's instructions.

## 2022-12-13 NOTE — PROGRESS NOTES
Patient Reported symptoms:    Right leg   Heaviness None of the time   Achiness Some of the time   Swelling None of the time   Throbbing None of the time   Itching None of the time   Appearance Moderately noticeable   Impact on work/activities Symptoms but full able to participate    Left Leg   Heaviness None of the time   Achiness None of the time   Swelling None of the time   Throbbing None of the time   Itching None of the time   Appearance Slightly noticeable   Impact on work/activities Symptoms but full able to participate

## 2022-12-14 ENCOUNTER — OFFICE VISIT (OUTPATIENT)
Dept: VASCULAR SURGERY | Facility: CLINIC | Age: 62
End: 2022-12-14
Payer: COMMERCIAL

## 2022-12-14 DIAGNOSIS — I78.1 SPIDER TELANGIECTASIS OF SKIN: Primary | ICD-10-CM

## 2022-12-14 PROCEDURE — S9999 SALES TAX: HCPCS | Performed by: SURGERY

## 2022-12-14 PROCEDURE — 36468 NJX SCLRSNT SPIDER VEINS: CPT | Performed by: SURGERY

## 2022-12-14 NOTE — PROGRESS NOTES
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  The patient voiced understanding and wished to proceed.  Informed consent was obtained.    I donned the Syris headlight and injected reticular veins over the posterior and posterior medial calves, a few on the anterior/pretibial areas and a telangiectasias from the ankles to the thighs circumferentially.  We used a total of 12 mL of 0.5% polidocanol foam.  I had the patient perform ankle pumps.    We cleaned her legs, had her don compression, then had her walk for 10 minutes.  She will return in approximately 6 weeks in follow-up.    Sclerotherapy    Date/Time: 12/14/2022 4:32 PM  Performed by: Jonathan Reyez MD  Authorized by: Jonathan Reyez MD     Time out: Immediately prior to the procedure a time out was called    Type:  Cosmetic  Session:  Full  Procedure side:  Bilateral  Solution/Amount:  .5 POLIDOCANOL  Syringes:  4 syringes (12 mL 0.5% polidocanol foam)  Patient tolerance:  Patient tolerated the procedure well with no immediate complications  Wrap/Hose:  Bennie Reyez MD    Dictated using Dragon voice recognition software which may result in transcription errors

## 2022-12-14 NOTE — LETTER
12/14/2022         RE: Griselda Lobo  [address] [address]        Dear Colleague,    Thank you for referring your patient, Griselda Lobo, to the [facility] [place]. Please see a copy of my visit note below.        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  The patient voiced understanding and wished to proceed.  Informed consent was obtained.    I donned the Syris headlight and injected reticular veins over the posterior and posterior medial calves, a few on the anterior/pretibial areas and a telangiectasias from the ankles to the thighs circumferentially.  We used a total of 12 mL of 0.5% polidocanol foam.  I had the patient perform ankle pumps.    We cleaned her legs, had her don compression, then had her walk for 10 minutes.  She will return in approximately 6 weeks in follow-up.    Sclerotherapy    Date/Time: 12/14/2022 4:32 PM  Performed by: Jonathan Reyez MD  Authorized by: Jonathan Reyez MD     Time out: Immediately prior to the procedure a time out was called    Type:  Cosmetic  Session:  Full  Procedure side:  Bilateral  Solution/Amount:  .5 POLIDOCANOL  Syringes:  4 syringes (12 mL 0.5% polidocanol foam)  Patient tolerance:  Patient tolerated the procedure well with no immediate complications  Wrap/Hose:  Bennie Reyez MD    Dictated using Dragon voice recognition software which may result in transcription errors      Again, thank you for allowing me to participate in the care of your patient.        Sincerely,        Jonathan Reyez MD

## 2023-01-27 ENCOUNTER — OFFICE VISIT (OUTPATIENT)
Dept: VASCULAR SURGERY | Facility: CLINIC | Age: 63
End: 2023-01-27
Payer: COMMERCIAL

## 2023-01-27 DIAGNOSIS — I78.1 SPIDER TELANGIECTASIS OF SKIN: Primary | ICD-10-CM

## 2023-01-27 PROCEDURE — 99207 PR VEINSOLUTIONS NO CHARGE VISIT: CPT | Performed by: SURGERY

## 2023-01-27 NOTE — LETTER
1/27/2023         RE: Griselda Lobo  [address] [place] [address]        Dear Colleague,    Thank you for referring your patient, Griselda Lobo, to the [facility]IN CL[facility] [place]. Please see a copy of my visit note below.    [facility] office note  Griselda Lobo returns in follow-up of 1 session of cosmetic sclerotherapy.  Overall she is very happy with improvement.    Exam  A few scattered telangiectasias remain on the distal anterolateral right leg, right infrapatellar area and a few scattered elsewhere.    There is trapped blood along the medial right thigh, anteromedial right thigh, lateral left thigh, medial left calf and on the posterior right thigh.  All these areas were cleaned with alcohol and the blood released.    Assessment  Good result following 1 session cosmetic sclerotherapy.  She will return for additional sclerotherapy after return from a trip to [place].    FABIAN Reyez MD, FACS    Dictated using Dragon voice recognition software which may result in transcription errors      Again, thank you for allowing me to participate in the care of your patient.        Sincerely,        Jonathan Reyez MD

## 2023-01-27 NOTE — PROGRESS NOTES
[facility] office note  Griselda Lobo returns in follow-up of 1 session of cosmetic sclerotherapy.  Overall she is very happy with improvement.    Exam  A few scattered telangiectasias remain on the distal anterolateral right leg, right infrapatellar area and a few scattered elsewhere.    There is trapped blood along the medial right thigh, anteromedial right thigh, lateral left thigh, medial left calf and on the posterior right thigh.  All these areas were cleaned with alcohol and the blood released.    Assessment  Good result following 1 session cosmetic sclerotherapy.  She will return for additional sclerotherapy after return from a trip to [place].    C Jerri Reyez MD, FACS    Dictated using Dragon voice recognition software which may result in transcription errors

## 2023-02-28 ENCOUNTER — OFFICE VISIT (OUTPATIENT)
Dept: VASCULAR SURGERY | Facility: CLINIC | Age: 63
End: 2023-02-28
Payer: COMMERCIAL

## 2023-02-28 DIAGNOSIS — I78.1 SPIDER TELANGIECTASIS OF SKIN: Primary | ICD-10-CM

## 2023-02-28 PROCEDURE — 36468 NJX SCLRSNT SPIDER VEINS: CPT | Performed by: SURGERY

## 2023-02-28 PROCEDURE — S9999 SALES TAX: HCPCS | Performed by: SURGERY

## 2023-02-28 NOTE — PROGRESS NOTES
Vein Clinic Sclerotherapy Note     Indications:  Residual, bilateral extremity spider telangiectasias of cosmetic concern; status post 1 session cosmetic sclerotherapy     Procedure:  Bilateral extremity cosmetic sclerotherapy-second session     Procedure description  Details of procedure including risks of allergic reaction, deep vein thrombosis, ulceration, superficial thrombophlebitis and hyperpigmentation were discussed.  The patient voiced understanding and wished to proceed.  Informed consent was obtained.    The patient had a few residual telangiectasias on the distal lateral right thigh and lateral right leg extending down to the ankle.  There are a few scattered elsewhere.  There were a few small areas of trapped blood.    After donning the Syris headlight, injected in these areas of circular with 0.5% polidocanol foam using a total of 2 syringes (6 mL) 0.5% polidocanol foam.    I cleaned areas of trapped blood with alcohol, milliseconds with an 18-gauge needle and then expressed thrombus.  We had a perform a closed.    Cleaned her legs, had her don compression, then had her walk 10 minutes.    She may return in 6 weeks in follow-up.    Sclerotherapy    Date/Time: 2/28/2023 9:21 AM  Performed by: Jonathan Reyez MD  Authorized by: Jonathan Reyez MD     Time out: Immediately prior to the procedure a time out was called    Type:  Cosmetic  Session:  Limited  Procedure side:  Bilateral  Solution/Amount:  .5 POLIDOCANOL  Syringes:  2  Evacuation of intraluminal thrombus under sterile conditions without complications.    Patient tolerance:  Patient tolerated the procedure well with no immediate complications  Wrap/Hose:  Bennie Reyez MD    Dictated using Dragon voice recognition software which may result in transcription errors

## 2023-02-28 NOTE — LETTER
2/28/2023         RE: Griselda Lobo  [address] [address]        Dear Colleague,    Thank you for referring your patient, Griselda Lobo, to the [facility] [place]. Please see a copy of my visit note below.        Vein Clinic Sclerotherapy Note     Indications:  Residual, bilateral extremity spider telangiectasias of cosmetic concern; status post 1 session cosmetic sclerotherapy     Procedure:  Bilateral extremity cosmetic sclerotherapy-second session     Procedure description  Details of procedure including risks of allergic reaction, deep vein thrombosis, ulceration, superficial thrombophlebitis and hyperpigmentation were discussed.  The patient voiced understanding and wished to proceed.  Informed consent was obtained.    The patient had a few residual telangiectasias on the distal lateral right thigh and lateral right leg extending down to the ankle.  There are a few scattered elsewhere.  There were a few small areas of trapped blood.    After donning the Syris headlight, injected in these areas of circular with 0.5% polidocanol foam using a total of 2 syringes (6 mL) 0.5% polidocanol foam.    I cleaned areas of trapped blood with alcohol, milliseconds with an 18-gauge needle and then expressed thrombus.  We had a perform a closed.    Cleaned her legs, had compression, then had her walk 10 minutes    Diabetes mellitus.  Today she may return in 6 weeks in follow-up.  Sclerotherapy    Date/Time: 2/28/2023 9:21 AM  Performed by: Jonathan Reyez MD  Authorized by: Jonathan Reyez MD     Time out: Immediately prior to the procedure a time out was called    Type:  Cosmetic  Session:  Limited  Procedure side:  Bilateral  Solution/Amount:  .5 POLIDOCANOL  Syringes:  2  Evacuation of intraluminal thrombus under sterile conditions without complications.    Patient tolerance:  Patient tolerated the procedure well with no immediate complications  Wrap/Hose:   Bennie Reyez MD    Dictated using Dragon voice recognition software which may result in transcription errors      Again, thank you for allowing me to participate in the care of your patient.        Sincerely,        Jonathan Reyez MD

## 2023-05-09 ENCOUNTER — OFFICE VISIT (OUTPATIENT)
Dept: VASCULAR SURGERY | Facility: CLINIC | Age: 63
End: 2023-05-09
Payer: COMMERCIAL

## 2023-05-09 DIAGNOSIS — I78.1 SPIDER TELANGIECTASIS OF SKIN: Primary | ICD-10-CM

## 2023-05-09 PROCEDURE — 99207 PR VEINSOLUTIONS NO CHARGE VISIT: CPT | Performed by: SURGERY

## 2023-05-09 NOTE — LETTER
5/9/2023         RE: Griselda Lobo  [address] [address]        Dear Colleague,    Thank you for referring your patient, Griselda Lobo, to the [facility]IN [facility] [place]. Please see a copy of my visit note below.    [facility] sclerotherapy follow-up note  Griselda Lobo returns in follow-up of 2 sessions of cosmetic sclerotherapy from which she feels like she has had significant improvement.    Exam  Right lower extremity: There are a few scattered telangiectasias over the anterolateral distal right leg and over the proximal medial right calf.  Trapped blood is noted in slightly larger veins that were injected on the proximal, medial right thigh.    Left lower extremity: A few, fine scattered telangiectasias but nothing of significance.    ASSESSMENT/PLAN  Good results following 2 sessions of cosmetic sclerotherapy I cleaned the areas of trapped blood on the proximal medial thigh, made stab wounds with an 18-gauge needle and expressed thrombus.    We cleaned her leg and applied cotton balls to the area.    She will return on an as-needed basis.    She inquired about compression hose for her  who has seen me as a patient.  We will look up his prescription and see if we can supply some knee-high compression for upcoming [place] travel.    FABIAN Reyez MD, FACS    Dictated using Dragon voice recognition software which may result in transcription errors            Again, thank you for allowing me to participate in the care of your patient.        Sincerely,        Jonathan Reyez MD

## 2023-05-09 NOTE — PROGRESS NOTES
[facility] [place] sclerotherapy follow-up note  Griselda Lobo returns in follow-up of 2 sessions of cosmetic sclerotherapy from which she feels like she has had significant improvement.    Exam  Right lower extremity: There are a few scattered telangiectasias over the anterolateral distal right leg and over the proximal medial right calf.  Trapped blood is noted in slightly larger veins that were injected on the proximal, medial right thigh.    Left lower extremity: A few, fine scattered telangiectasias but nothing of significance.    ASSESSMENT/PLAN  Good results following 2 sessions of cosmetic sclerotherapy I cleaned the areas of trapped blood on the proximal medial thigh, made stab wounds with an 18-gauge needle and expressed thrombus.    We cleaned her leg and applied cotton balls to the area.    She will return on an as-needed basis.    She inquired about compression hose for her  who has seen me as a patient.  We will look up his prescription and see if we can supply some knee-high compression for upcoming overseas travel.    C Jerri Reyez MD, FACS    Dictated using Dragon voice recognition software which may result in transcription errors